# Patient Record
Sex: MALE | Race: WHITE | NOT HISPANIC OR LATINO | Employment: UNEMPLOYED | URBAN - METROPOLITAN AREA
[De-identification: names, ages, dates, MRNs, and addresses within clinical notes are randomized per-mention and may not be internally consistent; named-entity substitution may affect disease eponyms.]

---

## 2023-03-28 ENCOUNTER — DOCUMENTATION (OUTPATIENT)
Dept: CARE COORDINATION | Facility: CLINIC | Age: 33
End: 2023-03-28
Payer: COMMERCIAL

## 2023-03-29 ENCOUNTER — DOCUMENTATION (OUTPATIENT)
Dept: CARE COORDINATION | Facility: CLINIC | Age: 33
End: 2023-03-29
Payer: COMMERCIAL

## 2023-03-29 NOTE — PROGRESS NOTES
Chip Byrne  Program: [facility] Generic Post-Discharge  MRN: [id number]  YOB: 1970    This patient had a RED on Wed, 29 Mar 2023 10:13:39 am EDT    Question(s) with flags that caused the Alert (up to last 5 values   recorded)    Question: Do you understand your plan for recovery and wellness?     Date:     2023-03-29     Color:    red     Answers:  No    Question: Signs of Infection     Date:     2023-03-29     Color:    red     Answers:  Red    Question: Since you left the hospital, have you experienced sweating or   shivering?     Date:     2023-03-29     Color:    red     Answers:  Yes

## 2023-04-03 PROBLEM — F90.9 ATTENTION DEFICIT HYPERACTIVITY DISORDER: Status: ACTIVE | Noted: 2021-09-08

## 2023-04-03 PROBLEM — F33.1 MODERATE RECURRENT MAJOR DEPRESSION (MULTI): Status: ACTIVE | Noted: 2022-03-02

## 2023-04-03 PROBLEM — E11.9 TYPE II DIABETES MELLITUS (MULTI): Status: ACTIVE | Noted: 2022-02-11

## 2023-04-03 PROBLEM — F41.1 GENERALIZED ANXIETY DISORDER: Status: ACTIVE | Noted: 2021-01-01

## 2023-04-03 PROBLEM — K58.0 IRRITABLE BOWEL SYNDROME WITH DIARRHEA: Status: ACTIVE | Noted: 2022-02-11

## 2023-04-03 PROBLEM — E78.00 PURE HYPERCHOLESTEROLEMIA: Status: ACTIVE | Noted: 2022-02-11

## 2023-04-03 PROBLEM — E10.9 TYPE 1 DIABETES MELLITUS (MULTI): Status: ACTIVE | Noted: 2022-03-02

## 2023-04-03 RX ORDER — LANCETS
EACH MISCELLANEOUS DAILY
COMMUNITY
Start: 2021-12-15

## 2023-04-03 RX ORDER — BUPROPION HYDROCHLORIDE 450 MG/1
1 TABLET, FILM COATED, EXTENDED RELEASE ORAL EVERY MORNING
COMMUNITY
End: 2023-11-02 | Stop reason: SDUPTHER

## 2023-04-03 RX ORDER — NAPROXEN 500 MG/1
1 TABLET ORAL 2 TIMES DAILY
COMMUNITY
Start: 2022-06-15 | End: 2023-11-02 | Stop reason: WASHOUT

## 2023-04-03 RX ORDER — ATOMOXETINE 80 MG/1
CAPSULE ORAL
COMMUNITY
End: 2023-11-02 | Stop reason: WASHOUT

## 2023-04-03 RX ORDER — INSULIN GLARGINE 100 [IU]/ML
15 INJECTION, SOLUTION SUBCUTANEOUS NIGHTLY
COMMUNITY
Start: 2021-12-16 | End: 2024-02-12 | Stop reason: SDUPTHER

## 2023-04-03 RX ORDER — LANCETS
EACH MISCELLANEOUS DAILY
COMMUNITY
Start: 2022-02-11

## 2023-04-03 RX ORDER — CYCLOBENZAPRINE HCL 5 MG
1 TABLET ORAL NIGHTLY PRN
COMMUNITY
Start: 2022-06-15 | End: 2023-04-19

## 2023-04-03 RX ORDER — IBUPROFEN 200 MG
CAPSULE ORAL DAILY
COMMUNITY
Start: 2022-02-11

## 2023-04-03 RX ORDER — CHOLECALCIFEROL (VITAMIN D3) 1250 MCG
1 TABLET ORAL
COMMUNITY
Start: 2022-02-11

## 2023-04-04 ENCOUNTER — OFFICE VISIT (OUTPATIENT)
Dept: PRIMARY CARE | Facility: CLINIC | Age: 33
End: 2023-04-04
Payer: COMMERCIAL

## 2023-04-04 VITALS
OXYGEN SATURATION: 98 % | DIASTOLIC BLOOD PRESSURE: 90 MMHG | WEIGHT: 176 LBS | TEMPERATURE: 97.7 F | HEART RATE: 112 BPM | BODY MASS INDEX: 28.41 KG/M2 | SYSTOLIC BLOOD PRESSURE: 140 MMHG

## 2023-04-04 DIAGNOSIS — M25.561 CHRONIC PAIN OF RIGHT KNEE: ICD-10-CM

## 2023-04-04 DIAGNOSIS — J34.2 DEVIATED NASAL SEPTUM: ICD-10-CM

## 2023-04-04 DIAGNOSIS — E10.65 TYPE 1 DIABETES MELLITUS WITH HYPERGLYCEMIA (MULTI): Primary | ICD-10-CM

## 2023-04-04 DIAGNOSIS — F41.1 GENERALIZED ANXIETY DISORDER: ICD-10-CM

## 2023-04-04 DIAGNOSIS — G89.29 CHRONIC PAIN OF RIGHT KNEE: ICD-10-CM

## 2023-04-04 PROCEDURE — 99214 OFFICE O/P EST MOD 30 MIN: CPT | Performed by: FAMILY MEDICINE

## 2023-04-04 PROCEDURE — 3080F DIAST BP >= 90 MM HG: CPT | Performed by: FAMILY MEDICINE

## 2023-04-04 PROCEDURE — 1036F TOBACCO NON-USER: CPT | Performed by: FAMILY MEDICINE

## 2023-04-04 PROCEDURE — 3077F SYST BP >= 140 MM HG: CPT | Performed by: FAMILY MEDICINE

## 2023-04-04 RX ORDER — VILOXAZINE HYDROCHLORIDE 200 MG/1
1 CAPSULE, EXTENDED RELEASE ORAL EVERY MORNING
COMMUNITY
Start: 2023-03-15 | End: 2023-11-02 | Stop reason: WASHOUT

## 2023-04-04 NOTE — PROGRESS NOTES
"  Assessment     ASSESSMENT/PLAN:      Problem List Items Addressed This Visit          Endocrine/Metabolic    Type 1 diabetes mellitus (CMS/HCC) - Primary       Other    Generalized anxiety disorder     Other Visit Diagnoses       Deviated nasal septum        Chronic pain of right knee                Patient Instructions:  Patient Instructions   T1DM: Patient advised to get A1c, call endocrinology about increasing mealtime Lantus, advised patient that increasing it to 20 units 3 times daily with meals should be okay  Right knee pain: Continue follow-up with Ortho  Nasal septal deviation: Plan to have nasal surgery, patient will let us know if he needs preop appointment      Signed by: Ria Handy,        FUTURE DIRECTION:   Review MRI, follow up on psych notes, update insulin dosage     Subjective   SUBJECTIVE:     HPI : Patient is a 32 y.o. male who presents today for the following:     Hospital follow-up 3/27/2023: Patient was admitted for syncope, was found to have leukocytosis that improved with IVF, patient was advised to discontinue \"worry until followed up with psychiatry  Since last visit patient states that he notices that his blood sugars can be as low as 160 and as high as > 399 before meals, he has tried to adjust Humalog 8 units on his own    Right knee pain  Occurred during Jujitsu, causing significant weakness, was seen by ortho, plan to have MRI     ADHD   Recently started Quelbree but has stopped it and plans to discuss usage with psychiatry    Review of Systems  Stated above otherwise negative    Past Medical History:   Diagnosis Date    Personal history of other endocrine, nutritional and metabolic disease 12/22/2021    History of diabetes mellitus        Past Surgical History:   Procedure Laterality Date    OTHER SURGICAL HISTORY  02/23/2022    No history of surgery        Current Outpatient Medications   Medication Instructions    atomoxetine (Strattera) 80 mg capsule     blood sugar " diagnostic (Blood Glucose Test) strip miscellaneous, Daily, Use 1 application<BR>FreeStyle Lite Test In Vitro strips    buPROPion XL (Forfivo XL) 450 mg 24 hr tablet 1 tablet, oral, Every morning, For depression    cholecalciferol (Vitamin D3) 1,250 mcg (50,000 unit) tablet 1 tablet, oral, Weekly    cyclobenzaprine (Flexeril) 5 mg tablet 1 tablet, oral, Nightly PRN    insulin glargine (LANTUS) 15 Units, subcutaneous, Nightly    lancets misc Daily, Use 2-3 times<BR>Glucometer Lancets    lancets misc Daily, Use 1 application    naproxen (EC Naprosyn) 500 mg EC tablet 1 tablet, oral, 2 times daily    NON FORMULARY B Complex oral capsules    omega-3/dha/epa/fish oil (OMEGA-3 ORAL) Omega-3 CF 1000 MG oral capsules    pen needle, diabetic (PEN NEEDLE MISC) Daily, Use, for insulin injections (can substitute)<BR>Caretouch Pen Needles 31 G X 6 mm    Qelbree 200 mg capsule,extended release 24hr 1 capsule, oral, Every morning        Allergies   Allergen Reactions    Sulfa (Sulfonamide Antibiotics) Unknown        Social History     Socioeconomic History    Marital status:      Spouse name: Not on file    Number of children: Not on file    Years of education: Not on file    Highest education level: Not on file   Occupational History    Not on file   Tobacco Use    Smoking status: Never    Smokeless tobacco: Never   Vaping Use    Vaping status: Not on file   Substance and Sexual Activity    Alcohol use: Not on file    Drug use: Not on file    Sexual activity: Not on file   Other Topics Concern    Not on file   Social History Narrative    Not on file     Social Determinants of Health     Financial Resource Strain: Not on file   Food Insecurity: Not on file   Transportation Needs: Not on file   Physical Activity: Not on file   Stress: Not on file   Social Connections: Not on file   Intimate Partner Violence: Not on file   Housing Stability: Not on file        Family History   Problem Relation Name Age of Onset    Diabetes  Mother      Arthritis Father      Arthritis Sibling Siblings         psoriatic    Heart attack Paternal Grandparent      Pancreatic cancer Paternal Grandparent          Objective     OBJECTIVE:     Vitals:    04/04/23 1439   BP: 140/90   Pulse: (!) 112   Temp: 36.5 °C (97.7 °F)   SpO2: 98%   Weight: 79.8 kg (176 lb)        Physical Exam  Constitutional:       Appearance: Normal appearance.   HENT:      Head: Normocephalic.   Pulmonary:      Effort: Pulmonary effort is normal.   Musculoskeletal:      Cervical back: Normal range of motion.   Neurological:      Mental Status: He is alert.   Psychiatric:         Mood and Affect: Mood normal.

## 2023-04-04 NOTE — PATIENT INSTRUCTIONS
T1DM: Patient advised to get A1c, call endocrinology about increasing mealtime Lantus,   Right knee pain: Continue follow-up with Ortho  Nasal septal deviation: Plan to have nasal surgery, patient will let us know if he needs preop appointment

## 2023-04-15 DIAGNOSIS — M54.50 LOW BACK PAIN, UNSPECIFIED: ICD-10-CM

## 2023-04-19 RX ORDER — CYCLOBENZAPRINE HCL 5 MG
TABLET ORAL
Qty: 30 TABLET | Refills: 5 | Status: SHIPPED | OUTPATIENT
Start: 2023-04-19

## 2023-04-20 LAB
ESTIMATED AVERAGE GLUCOSE FOR HBA1C: 209 MG/DL
HEMOGLOBIN A1C/HEMOGLOBIN TOTAL IN BLOOD: 8.9 %

## 2023-07-07 ENCOUNTER — HOSPITAL ENCOUNTER (OUTPATIENT)
Dept: DATA CONVERSION | Facility: HOSPITAL | Age: 33
End: 2023-07-07
Attending: GENERAL PRACTICE | Admitting: GENERAL PRACTICE
Payer: COMMERCIAL

## 2023-07-07 DIAGNOSIS — Z88.2 ALLERGY STATUS TO SULFONAMIDES: ICD-10-CM

## 2023-07-07 DIAGNOSIS — E11.9 TYPE 2 DIABETES MELLITUS WITHOUT COMPLICATIONS (MULTI): ICD-10-CM

## 2023-07-07 DIAGNOSIS — J34.2 DEVIATED NASAL SEPTUM: ICD-10-CM

## 2023-07-07 DIAGNOSIS — Z79.82 LONG TERM (CURRENT) USE OF ASPIRIN: ICD-10-CM

## 2023-07-07 DIAGNOSIS — J34.3 HYPERTROPHY OF NASAL TURBINATES: ICD-10-CM

## 2023-07-07 DIAGNOSIS — Z79.4 LONG TERM (CURRENT) USE OF INSULIN (MULTI): ICD-10-CM

## 2023-07-07 LAB
ANION GAP IN SER/PLAS: 14 MMOL/L (ref 10–20)
CALCIUM (MG/DL) IN SER/PLAS: 9.1 MG/DL (ref 8.6–10.3)
CARBON DIOXIDE, TOTAL (MMOL/L) IN SER/PLAS: 26 MMOL/L (ref 21–32)
CHLORIDE (MMOL/L) IN SER/PLAS: 99 MMOL/L (ref 98–107)
CREATININE (MG/DL) IN SER/PLAS: 0.98 MG/DL (ref 0.5–1.3)
GFR MALE: >90 ML/MIN/1.73M2
GLUCOSE (MG/DL) IN SER/PLAS: 253 MG/DL (ref 74–99)
POCT GLUCOSE: 269 MG/DL (ref 74–99)
POCT GLUCOSE: 300 MG/DL (ref 74–99)
POCT GLUCOSE: 314 MG/DL (ref 74–99)
POTASSIUM (MMOL/L) IN SER/PLAS: 3.7 MMOL/L (ref 3.5–5.3)
SODIUM (MMOL/L) IN SER/PLAS: 135 MMOL/L (ref 136–145)
UREA NITROGEN (MG/DL) IN SER/PLAS: 15 MG/DL (ref 6–23)

## 2023-09-29 VITALS
RESPIRATION RATE: 12 BRPM | SYSTOLIC BLOOD PRESSURE: 146 MMHG | HEIGHT: 66 IN | TEMPERATURE: 97.2 F | DIASTOLIC BLOOD PRESSURE: 92 MMHG | HEART RATE: 79 BPM | BODY MASS INDEX: 26.22 KG/M2 | WEIGHT: 163.14 LBS

## 2023-09-30 NOTE — H&P
History of Present Illness:   History Present Illness:  Reason for surgery: deviated septum   HPI:       · Deviated septum (470) (J34.2)    Provider Impressions    32yoM with deviated septum, plan for septoturbinoplasty.        Chief Complaint    Discuss surgery      History of Present [id number] presents for discussion of upcoming surgery. prior surgery cancelled  due to scheduling issue.   no change in nasal symptoms.       Recall from prior note:     The pt is a 32yoM who presents for f/u of nasal obstruction/congestion, would like to discuss possible septoturbs.  reports ongoing R>L nasal obstruction/congestion. has used flonase spray for >3mo without significant relief of symptoms. reports use of breathrite strips without consistent relief.   reports minor nasal trauma with combatives, but no nasal fracture or persistent change in airflow associated with trauma.   no sinusitis symptoms.       Recall from prior note:      The pt is a 32yoM with DM (now well controlled after diagnosis this year) who presents for evaluation of chronic nasal obstruction/congestion which he attributes to nasal trauma as a youth. 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   Notes R>L chronic nasal obstruction.  Denies significant seasonal allergic rhinitis symptoms.   Notes intermittent right eye pressure sensation, but follows with optometry without concerning findings.  Denies significant recurrent sinusitis history.    Reports some history of epistaxis, but only when living/traveling in arid climates.     no concerns for external nasal appearance.              Review of Systems  ROS: Pertinent positives as noted in HPI.    - CONSTITUTIONAL: Does not report weight loss, fever or chills.    - HEENT:   Ear: Does not report tinnitus, vertigo, hearing loss, otalgia, otorrhea  Nose: Does not report decreased smell  Throat: Does not report pain,  dysphagia, odynophagia  Larynx: Does not report hoarseness, voice changes, noisy breathing, difficulty breathing, pain with speaking (odynophonia)  Trachea: Does not report noisy or difficulty breathing  Neck: Does not report new lumps or bumps, pain, swelling  Face: Does not report sinus pain, pressure, swelling, numbness     - RESPIRATORY: Does not report SOB or cough.    - CV: Does not report palpitations or chest pain.     - GI: Does not report abdominal pain, nausea, vomiting or diarrhea.    - : Does not report dysuria or urinary frequency.    - MSK: Does not report myalgia or joint pain.    - SKIN: Does not report rash or pruritus.    - NEUROLOGICAL: Does not report headache or syncope.    - PSYCHIATRIC: Does not report recent changes in mood. Does not report anxiety or depression.           Active Problems   · Abdominal pain (789.00) (R10.9)   · Abnormal CT of the abdomen (793.6) (R93.5)   · Acute bilateral low back pain without sciatica (724.2,338.19) (M54.50)   · Acute lateral meniscal tear, subsequent encounter (V58.89) (S83.289D)   · Bloating (787.3) (R14.0)   · BMI 27.0-27.9,adult (V85.23) (Z68.27)   · Burning with urination (788.1) (R30.0)   · Change in bowel habits (787.99) (R19.4)   · Chondral defect of right patella (717.9) (M23.8X1)   · Darkening of skin (709.09) (L81.4)   · Deviated septum (470) (J34.2)   · Diarrhea (787.91) (R19.7)   · Discogenic low back pain (724.2) (M51.36)   · Elevated blood sugar (790.29) (R73.9)   · Exposure to STD (V01.6) (Z20.2)   · Flank pain (789.09) (R10.9)   · Gross hematuria (599.71) (R31.0)   · Hip pain, bilateral (719.45) (M25.551,M25.552)   · Hip pain, unspecified laterality (719.45) (M25.559)   · Hypertrophy of both inferior nasal turbinates (478.0) (J34.3)   · Knee pain, left (719.46) (M25.562)   · Lateral meniscus tear (836.1) (S83.289A)   · Long-term insulin use (V58.67) (Z79.4)   · Loss of appetite (783.0) (R63.0)   · Low back pain, unspecified (724.2)  (M54.50)   · 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)   · Muscle weakness of lower extremity (728.87) (M62.81)   · Pain of left hip joint (719.45) (M25.552)   · Prepatellar bursitis of right knee (726.65) (M70.41)   · Right knee pain (719.46) (M25.561)   · Sacroiliac joint dysfunction of left side (724.6) (M53.3)   · Sacroiliitis (720.2) (M46.1)   · Type 1 diabetes mellitus (250.01) (E10.9)   · Urinary frequency (788.41) (R35.0)    Past Medical History   · History of diabetes mellitus (V12.29) (Z86.39)    Surgical History   · No history of surgery    Family History   · Family history of type 2 diabetes mellitus (V18.0) (Z83.3)    Social History   · No alcohol use   · No illicit drug use   · Non-smoker (V49.89) (Z78.9)   · Denied: History of Social alcohol use    Allergies   · Misc. Sulfa Containing Compounds  Recorded By: Windy Maloney; 12/22/2021 2:18:29 PM   · sulfa  Recorded By: Manuel Hernandez; 1/12/2023 1:02:10 PM    Current Meds    Medication Name Instruction   Accu-Chek Guide In Vitro Strip TEST 6 TIMES PER DAY   Acetaminophen 500 MG Oral Tablet TAKE 2 TABLET Every 8 hours   Aspirin EC 81 MG TBEC TAKE 1 TABLET Every twelve hours   buPROPion HCl ER (XL) 450 MG Oral Tablet Extended Release 24 Hour Take 1 tablet daily   Celecoxib 200 MG Oral Capsule TAKE 1 CAPSULE BY MOUTH TWICE A DAY WITH FOOD   Cyclobenzaprine HCl - 5 MG Oral Tablet TAKE 1 TABLET BY MOUTH DAILY AT BEDTIME AS NEEDED FOR MUSCLE SPASM   Easy Touch Pen Needles 31G X 5 MM USE 4 TIMES A DAY   FreeStyle Annika 3 Sensor change sensor every 14 days   FreeStyle Annika 3 Sensor Use one new sensor every 14 days to check blood glucose   HumaLOG KwikPen 100 UNIT/ML Subcutaneous Solution Pen-injector Inject 1 unit for every 10 grams of carb  Max of 60 units per day with sliding scale   Lantus SoloStar 100 UNIT/ML Subcutaneous Solution Pen-injector INJECT 18 UNIT Bedtime   Ondansetron 4 MG Oral Tablet Disintegrating TAKE 1 TABLET BY MOUTH TWICE A DAY AS NEEDED    Qelbree 100 MG Oral Capsule Extended Release 24 Hour    Sertraline HCl - 25 MG Oral Tablet    Sertraline HCl - 50 MG Oral Tablet      Vitals  Vital Signs    Recorded: 19Jun2023 03:08PM   Height 5 ft 6 in   Weight 174 lb    BMI Calculated 28.08 kg/m2   BSA Calculated 1.89   Tobacco Use b) No   PHQ-2  #1. Over the last 2 weeks have you felt down, depressed or hopeless?  (If yes, answer PHQ-9 below) No   PHQ-2  #2. Over the last 2 weeks have you felt little interest  or pleasure in doing things?  (If yes, answer PHQ-9 below) No   Falls Screening (Age 18+) a) No falls within the last year     Physical Exam    GENERAL: Alert & Oriented to person, place and thing; Normal affect and appearance. Well developed and well nourished. Conversant & cooperative with examination.     HEAD: Normocephalic, atraumatic. No sinus tenderness to palpation. Normal parotid bilaterally. Normal facial strength.     NEUROLOGIC: Cranial nerves II-XII grossly intact, gait WNL. Normal mood and affect.    EYES: Extraocular movements intact. Pupils equal, round, reactive to light and accomodation. No nystagmus, no ptosis. no scleral injection.    EAR: Normal auricle. No discomfort or TTP with manipulation. Otoscopic exam showed normal external auditory canals bilaterally. No purulence or EAC inflammation. Minimal cerumen. Right tympanic membrane clear and mobile without evidence of perforation,  retraction or middle ear effusion. Left tympanic membrane clear and mobile without evidence of perforation, retraction or middle ear effusion.     NOSE: mild rightward convex C shape external deformity. No external nasal lesions, lacerations, or scars. Nasal tip symmetrical with normal nasal valves. Nasal cavity with low rightward septum with posterior rightward spur, normal mucosa and l>r obstructive  turbinates. No lesions, masses, purulence or polyps. >75% APPARENT OBSTRUCTION OF AIR PASSAGEWAY DUE TO SEPTAL AND TURBINATE ANATOMY.    OC/OP: Mucous  membranes moist, no masses, lesions or exudates. Normal tongue, floor of mouth, teeth, gums, lips. Normal posterior pharyngeal wall. Normal tonsils without erythema, exudate or obvious calculi     NECK: No neck masses or thyroid enlargement. Trachea midline. No tenderness to palpation    LYMPHATIC: No cervical lymphadenopathy.     RESPIRATORY: Symmetric chest elevation & no retractions. No significant hoarseness. No increased work of breathing.    CV: No clubbing or cyanosis. No obvious edema    Skin: no facial rashes, vesicles or lesions.     Extremities: no gross abnormalities                  Signatures  Electronically signed by : Peter Moser MD; Jun 19 2023  3:20PM EST (Author)        Allergies:        Allergies:  ·  sulfa drugs : Unknown    Home Medication Review:   Home Medications Reviewed: yes     Impression/Procedure:   ·  Impression and Planned Procedure: deviated septum, plan for septoturbinoplasty       ERAS (Enhanced Recovery After Surgery):  ·  ERAS Patient: no       Vital Signs:  Temperature C: 36.2 degrees C   Temperature F: 97.1 degrees F   Heart Rate: 79 beats per minute   Respiratory Rate: 12 breath per minute   Blood Pressure Systolic: 146 mm/Hg   Blood Pressure Diastolic: 92 mm/Hg     Physical Exam by System:    Constitutional: see above exam as listed   Eyes: see above exam as listed   ENMT: see above exam as listed   Head/Neck: see above exam as listed   Respiratory/Thorax: see above exam as listed   Cardiovascular: see above exam as listed   Musculoskeletal: see above exam as listed   Lymphatic: see above exam as listed   Psychological: see above exam as listed   Skin: see above exam as listed     Consent:   COVID-19 Consent:  ·  COVID-19 Risk Consent Surgeon has reviewed key risks related to the risk of hailee COVID-19 and if they contract COVID-19 what the risks are.       Electronic Signatures:  Peter Moser)  (Signed 24-Jul-2023 12:02)   Authored: History of Present  Illness, Allergies, Home  Medication Review, Impression/Procedure, ERAS, Physical Exam, Consent, Note Completion      Last Updated: 24-Jul-2023 12:02 by Peter Moser)

## 2023-10-02 NOTE — OP NOTE
PROCEDURE DETAILS    Preoperative Diagnosis:  Deviated nasal septum, J34.2  Nasal turbinate hypertrophy, J34.3    Postoperative Diagnosis:  Deviated nasal septum, J34.2  Nasal turbinate hypertrophy, J34.3    Surgeon: Peter Moser  Resident/Fellow/Other Assistant: None of these were associated with this case    Procedure:  1.  Septoplasty and Turbinoplasty     Anesthesia: No anesthesiologist associated with this case  Estimated Blood Loss: 10 ml  Findings: low rightward and postesrior rightward septum.  right side only rent.    Specimens(s) Collected: no,           Operative Report:   Procedure   The patient was met in the preoperative holding area, and the operative plan was discussed.  All questions were answered.    The patient was brought back to the operative suite by anesthesia staff, laid supine on the operating table and general endotracheal anesthesia was induced.  The patient was prepped and draped in the usual standard fashion and an operative pause was performed.   A total of 9cc of 1% lidocaine with 1:100,000 epinephrine was injected in the submucoperichondreal plane bilaterally, as well as into the inferior turbinates.  Afrin soaked pledgets were then inserted into the nose, and allowed to sit while the surgical  team scrubbed.  Attention was first turned to the inferior turbinates.  A 2mm straight shot microdebrider with the turbinate blade was used to perform a submucous resection of the right and then left inferior turbinate with good effect.  A [place] elevator  was then used to outfracture the inferior turbinates.    After this, attention was turned to the septum.  A left sided hemitransfixion incision was made using a 15 blade to access the submucoperichondrial plane.  This plane was then further developed using a phil and then flaca elevator.  The dissection  was carried back to and beyond the bony/cartilagenous junction, down to the floor of the nose, and high into the nasal valve.    Of note, the dissection plane was found to be  easily raised. The caudal aspect of the septum was then reflected through the  hemitranfixion incision and a 15 blade was used to access the submucoperichondrial plane on the contralateral side.  A similar dissection of the plane was peformed on this side.  After this, the deviated and obstructive septal cartilage and bone was reshaped  and/or removed using a combination of the septal D knife, double action septal scissors, through biting forceps, and jamie forceps.  Care was taken to leave an L strut of dorsal/caudal septum of >1.5cm width.  Care was taken not to create or propagate  fractures superiorly to endanger the skull base.  Superior and then inferior releasing incisions were made in the septum prior to any grasping force was applied.  After repair of the deviated septum, the overall septum was felt to be straight and midline.   The nasal passages were widely patent bilaterally.    There   was a right side only mucosal rent .    Quilting sutures made of 4-0 fast gut were placed to approximate the septal mucosa and close any mucosal rents present.  The hemitransfixion incision was closed with 4-0 fast gut.  Barker splints coated in bactroban were placed in the nasal cavity under  direct visualization, and secured in place using 2-0 prolene.  An orogastric tube was passed to suction out the contents of the esophagus and stomach.  The patient was then turned back over to the anesthesia service for extubation and transport to the  post-operative recovery area.                          Attestation:   Note Completion:  Attending Attestation I performed the procedure without a resident         Electronic Signatures:  Peter Moser)  (Signed 07-Jul-2023 08:30)   Authored: Post-Operative Note, Chart Review, Note Completion      Last Updated: 07-Jul-2023 08:30 by Peter Moser)

## 2023-11-02 ENCOUNTER — TELEMEDICINE (OUTPATIENT)
Dept: PRIMARY CARE | Facility: CLINIC | Age: 33
End: 2023-11-02
Payer: COMMERCIAL

## 2023-11-02 DIAGNOSIS — F41.1 GENERALIZED ANXIETY DISORDER: Primary | ICD-10-CM

## 2023-11-02 PROCEDURE — 99213 OFFICE O/P EST LOW 20 MIN: CPT | Performed by: FAMILY MEDICINE

## 2023-11-02 RX ORDER — BUPROPION HYDROCHLORIDE 450 MG/1
450 TABLET, FILM COATED, EXTENDED RELEASE ORAL EVERY MORNING
Qty: 90 TABLET | Refills: 1 | Status: SHIPPED | OUTPATIENT
Start: 2023-11-02 | End: 2024-04-10

## 2023-11-02 NOTE — PROGRESS NOTES
Assessment     ASSESSMENT/PLAN:      Problem List Items Addressed This Visit       Generalized anxiety disorder - Primary    Relevant Medications    buPROPion XL (Forfivo XL) 450 mg 24 hr tablet         Patient Instructions:  Patient Instructions   We will try to order bupropion 450mg daily      Patient consented to virtual encounter. If patient was felt to need in-person evaluation they were directed to the office or ED as appropriate. Total time spent interacting with patient was [8] minutes.     Patient presents today via video telemedicine encounter. This encounter is not taking place in person due to COVID.    Signed by: Ria Handy DO       FUTURE DIRECTION:   []    Subjective     SUBJECTIVE:     HPI : Patient is a 33 y.o. male who presents today via video telemedicine encounter to discuss the following:    Depression/TAIWO  - has been seeing psych and has been managed with bupropion 450mg   - also taking supplements B vitamin complex, Fish oil 1000mg, lions darien mushroom complex, ashwaghanda 3,000, 88mg saffron,     Review of Systems    Objective   OBJECTIVE:     There were no vitals filed for this visit.    Physical Exam  Constitutional:       Appearance: Normal appearance.   HENT:      Head: Normocephalic.   Pulmonary:      Effort: Pulmonary effort is normal.   Musculoskeletal:      Cervical back: Normal range of motion.   Neurological:      Mental Status: He is alert.   Psychiatric:         Mood and Affect: Mood normal.

## 2024-02-12 DIAGNOSIS — E10.65 TYPE 1 DIABETES MELLITUS WITH HYPERGLYCEMIA (MULTI): Primary | ICD-10-CM

## 2024-02-12 RX ORDER — INSULIN GLARGINE 100 [IU]/ML
18 INJECTION, SOLUTION SUBCUTANEOUS NIGHTLY
Qty: 5 EACH | Refills: 5 | Status: SHIPPED | OUTPATIENT
Start: 2024-02-12 | End: 2024-04-16 | Stop reason: SDUPTHER

## 2024-02-23 ENCOUNTER — OFFICE VISIT (OUTPATIENT)
Dept: URGENT CARE | Facility: CLINIC | Age: 34
End: 2024-02-23
Payer: COMMERCIAL

## 2024-02-23 VITALS
TEMPERATURE: 98.6 F | DIASTOLIC BLOOD PRESSURE: 79 MMHG | HEART RATE: 98 BPM | HEIGHT: 67 IN | OXYGEN SATURATION: 94 % | SYSTOLIC BLOOD PRESSURE: 128 MMHG | BODY MASS INDEX: 29 KG/M2 | WEIGHT: 184.8 LBS

## 2024-02-23 DIAGNOSIS — H10.31 ACUTE BACTERIAL CONJUNCTIVITIS OF RIGHT EYE: Primary | ICD-10-CM

## 2024-02-23 DIAGNOSIS — H66.90 ACUTE OTITIS MEDIA, UNSPECIFIED OTITIS MEDIA TYPE: ICD-10-CM

## 2024-02-23 PROCEDURE — 99213 OFFICE O/P EST LOW 20 MIN: CPT | Performed by: NURSE PRACTITIONER

## 2024-02-23 PROCEDURE — 3074F SYST BP LT 130 MM HG: CPT | Performed by: NURSE PRACTITIONER

## 2024-02-23 PROCEDURE — 1036F TOBACCO NON-USER: CPT | Performed by: NURSE PRACTITIONER

## 2024-02-23 PROCEDURE — 3078F DIAST BP <80 MM HG: CPT | Performed by: NURSE PRACTITIONER

## 2024-02-23 PROCEDURE — 3008F BODY MASS INDEX DOCD: CPT | Performed by: NURSE PRACTITIONER

## 2024-02-23 RX ORDER — CIPROFLOXACIN HYDROCHLORIDE 3 MG/ML
1 SOLUTION/ DROPS OPHTHALMIC
Qty: 5 ML | Refills: 0 | Status: SHIPPED | OUTPATIENT
Start: 2024-02-23 | End: 2024-02-28

## 2024-02-23 RX ORDER — AMOXICILLIN 875 MG/1
875 TABLET, FILM COATED ORAL 2 TIMES DAILY
Qty: 20 TABLET | Refills: 0 | Status: SHIPPED | OUTPATIENT
Start: 2024-02-23 | End: 2024-03-04

## 2024-02-23 ASSESSMENT — ENCOUNTER SYMPTOMS: COUGH: 1

## 2024-02-23 NOTE — PROGRESS NOTES
Subjective   Patient ID: Chip Byrne is a 33 y.o. male.    Patient presents today with chills, runny nose w/rt side is bleeding, rt swollen, both eyes are red and crusty.  Patient states symptoms have been for the 2 to 3 days.  Does wear contacts.  States that runny nose is having clear discharge.  Mild chills unknown fever.  Complains of slight bodyaches.  Denies any eye trauma states that the URI symptoms started first and then the bilateral redness      History provided by:  Patient   used: No    Cough        The following portions of the chart were reviewed this encounter and updated as appropriate:         Review of Systems   Respiratory:  Positive for cough.    All other systems reviewed and are negative.    Objective   Physical Exam  Vitals and nursing note reviewed.   Constitutional:       General: He is not in acute distress.     Appearance: Normal appearance. He is not toxic-appearing.   HENT:      Head: Atraumatic.      Right Ear: External ear normal.      Left Ear: External ear normal.      Nose: Nose normal.      Mouth/Throat:      Mouth: Mucous membranes are moist.   Eyes:      General:         Right eye: Discharge present. No foreign body or hordeolum.         Left eye: Discharge present.No foreign body or hordeolum.      Extraocular Movements: Extraocular movements intact.      Right eye: Normal extraocular motion and no nystagmus.      Left eye: Normal extraocular motion and no nystagmus.      Conjunctiva/sclera:      Right eye: Right conjunctiva is injected.      Left eye: Left conjunctiva is injected.   Cardiovascular:      Rate and Rhythm: Normal rate and regular rhythm.      Heart sounds: No murmur heard.     No gallop.   Pulmonary:      Effort: Pulmonary effort is normal. No respiratory distress.      Breath sounds: Normal breath sounds. No wheezing.   Musculoskeletal:         General: Normal range of motion.      Cervical back: Normal range of motion.   Skin:      General: Skin is warm and dry.      Capillary Refill: Capillary refill takes less than 2 seconds.      Coloration: Skin is not jaundiced.   Neurological:      General: No focal deficit present.      Mental Status: He is alert and oriented to person, place, and time.   Psychiatric:         Mood and Affect: Mood normal.         Behavior: Behavior normal.         Thought Content: Thought content normal.       Procedures    Assessment/Plan   Diagnoses and all orders for this visit:  Acute bacterial conjunctivitis of right eye  -     ciprofloxacin (Ciloxan) 0.3 % ophthalmic solution; Administer 1 drop into the right eye every 2 hours for 5 days.  Acute otitis media, unspecified otitis media type  -     amoxicillin (Amoxil) 875 mg tablet; Take 1 tablet (875 mg) by mouth 2 times a day for 10 days.

## 2024-04-09 DIAGNOSIS — E10.9 TYPE 1 DIABETES MELLITUS WITHOUT COMPLICATIONS (MULTI): ICD-10-CM

## 2024-04-09 DIAGNOSIS — F41.1 GENERALIZED ANXIETY DISORDER: ICD-10-CM

## 2024-04-10 RX ORDER — INSULIN LISPRO 100 [IU]/ML
INJECTION, SOLUTION INTRAVENOUS; SUBCUTANEOUS
Qty: 10 EACH | Refills: 3 | Status: SHIPPED | OUTPATIENT
Start: 2024-04-10

## 2024-04-10 RX ORDER — BUPROPION HYDROCHLORIDE 450 MG/1
450 TABLET, FILM COATED, EXTENDED RELEASE ORAL EVERY MORNING
Qty: 90 TABLET | Refills: 1 | Status: SHIPPED | OUTPATIENT
Start: 2024-04-10 | End: 2024-10-07

## 2024-04-16 ENCOUNTER — OFFICE VISIT (OUTPATIENT)
Dept: ENDOCRINOLOGY | Facility: CLINIC | Age: 34
End: 2024-04-16
Payer: COMMERCIAL

## 2024-04-16 VITALS
SYSTOLIC BLOOD PRESSURE: 130 MMHG | BODY MASS INDEX: 29.35 KG/M2 | DIASTOLIC BLOOD PRESSURE: 72 MMHG | WEIGHT: 187 LBS | HEIGHT: 67 IN | HEART RATE: 83 BPM

## 2024-04-16 DIAGNOSIS — E10.65 TYPE 1 DIABETES MELLITUS WITH HYPERGLYCEMIA (MULTI): Primary | ICD-10-CM

## 2024-04-16 DIAGNOSIS — Z79.4 LONG-TERM INSULIN USE (MULTI): ICD-10-CM

## 2024-04-16 DIAGNOSIS — Z13.0 SCREENING, IRON DEFICIENCY ANEMIA: ICD-10-CM

## 2024-04-16 PROCEDURE — 3078F DIAST BP <80 MM HG: CPT | Performed by: INTERNAL MEDICINE

## 2024-04-16 PROCEDURE — 3008F BODY MASS INDEX DOCD: CPT | Performed by: INTERNAL MEDICINE

## 2024-04-16 PROCEDURE — 95251 CONT GLUC MNTR ANALYSIS I&R: CPT | Performed by: INTERNAL MEDICINE

## 2024-04-16 PROCEDURE — 3075F SYST BP GE 130 - 139MM HG: CPT | Performed by: INTERNAL MEDICINE

## 2024-04-16 PROCEDURE — 99214 OFFICE O/P EST MOD 30 MIN: CPT | Performed by: INTERNAL MEDICINE

## 2024-04-16 RX ORDER — BLOOD-GLUCOSE SENSOR
EACH MISCELLANEOUS
COMMUNITY
Start: 2024-04-06 | End: 2024-04-16 | Stop reason: SDUPTHER

## 2024-04-16 RX ORDER — BLOOD-GLUCOSE SENSOR
EACH MISCELLANEOUS
Qty: 2 EACH | Refills: 11 | Status: SHIPPED | OUTPATIENT
Start: 2024-04-16

## 2024-04-16 RX ORDER — INSULIN GLARGINE 100 [IU]/ML
20 INJECTION, SOLUTION SUBCUTANEOUS NIGHTLY
Qty: 5 EACH | Refills: 5 | Status: SHIPPED | OUTPATIENT
Start: 2024-04-16 | End: 2024-10-13

## 2024-04-16 ASSESSMENT — ENCOUNTER SYMPTOMS
DIARRHEA: 1
ROS SKIN COMMENTS: DRY SKIN
NAUSEA: 1
PALPITATIONS: 1
UNEXPECTED WEIGHT CHANGE: 1
ABDOMINAL PAIN: 1
WEAKNESS: 1
TREMORS: 1
CONSTIPATION: 1
FATIGUE: 1

## 2024-04-16 NOTE — PATIENT INSTRUCTIONS
Thank you for choosing [facility]  for your health care needs.  If you have any questions, concerns or medical needs, please feel free to contact our office at [phone].    Please ensure you complete your blood work one week before the next scheduled appointment.    To obtain blood and urine tests   To restart Lantus 20 units SQ bedtime  To continue Humalog 1 unit for every 10 grams of carb   Please continue an insulin sliding scale as follows:   150-200mg/dL - 2 units   201-250mg/dL - 4 units   251-300 mg/dL - 6 untis   301-350mg/dL - 8 units   >351mg/dL - 10 units  To continue FreeStyle Annika CGM for the intensive glucose monitoring due to the dynamic nature of insulin requirements, the narrow therapeutic index of insulin and the potentially fatal consequences of treatment  Counseled that the goal A1C should be 7% or less  Counseled glycemic control is warranted to prevent microvascular complications  For follow up in 4 months with glucose log

## 2024-04-16 NOTE — PROGRESS NOTES
"Subjective   Chip Byrne is a 33 y.o. male who presents for a follow-up evaluation of Type 1 diabetes mellitus. The initial diagnosis of diabetes was made in December 2021 .     Life has been busy getting.  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      His diabetes was therefore put on the back burner due to the above.      Known complications due to diabetes included none    Cardiovascular risk factors include diabetes mellitus and male gender. The patient is not on an ACE inhibitor or angiotensin II receptor blocker.  The patient has not been previously hospitalized due to diabetic ketoacidosis.     Current symptoms/problems include none. His clinical course has been stable.     The patient is currently checking the blood glucose multiple times per day.    Patient is using: continuous glucose monitor                                                              Hypoglycemia frequency: 0%  Hypoglycemia awareness: N/A     Current Medication Regimen - forgets as he gets busy at work   Humalog 1 unit for every 10 grams      MEALS:  can forget to eat  Pretzel bun and reslish for $6  Beverages - protein drink     Exercise regimen - denies       Review of Systems   Constitutional:  Positive for fatigue and unexpected weight change (Weight gain and weight loss).   HENT:  Positive for tinnitus.    Eyes:  Positive for visual disturbance (When sugar is 500mg/dL).   Cardiovascular:  Positive for palpitations.   Gastrointestinal:  Positive for abdominal pain, constipation, diarrhea and nausea.   Genitourinary:         Denies nocturia x 1-2   Skin:         Dry skin    Neurological:  Positive for tremors and weakness.   All other systems reviewed and are negative.      Objective   /72 (BP Location: Right arm, Patient Position: Sitting, BP Cuff Size: Adult)   Pulse 83   Ht 1.702 m (5' 7\")   Wt 84.8 kg (187 lb)   BMI 29.29 kg/m²   Physical Exam  Vitals and nursing note reviewed. "   Constitutional:       General: He is not in acute distress.     Appearance: Normal appearance. He is normal weight.   HENT:      Head: Normocephalic and atraumatic.      Nose: Nose normal.      Mouth/Throat:      Mouth: Mucous membranes are moist.   Eyes:      Extraocular Movements: Extraocular movements intact.   Cardiovascular:      Rate and Rhythm: Normal rate and regular rhythm.   Pulmonary:      Effort: Pulmonary effort is normal.      Breath sounds: Normal breath sounds.   Musculoskeletal:         General: Normal range of motion.   Skin:     General: Skin is warm.   Neurological:      Mental Status: He is alert and oriented to person, place, and time.   Psychiatric:         Mood and Affect: Mood normal.       Lab Review  Lab Results   Component Value Date    HGBA1C 8.9 (A) 04/19/2023     Lab Results   Component Value Date    GLUCOSE 253 (H) 07/07/2023    CALCIUM 9.1 07/07/2023     (L) 07/07/2023    K 3.7 07/07/2023    CO2 26 07/07/2023    CL 99 07/07/2023    BUN 15 07/07/2023    CREATININE 0.98 07/07/2023     Lab Results   Component Value Date    CHOL 226 (H) 12/08/2021       Health Maintenance:   Foot Exam:   Eye Exam:  Urine Albumin:  March 10, 2022    CGM Interpretation/Plan   14 day CGM download was reviewed in detail as documented above and will be attached to chart.  A minimum of 72 hours of glucose data was used to inform the management plan outlined below.   Only 5% of values is within target range.  He is hyperglycemic at all time points.  He has not been utilizing basal insulin.    Assessment/Plan   33-year-old male presents for follow up for type 1 diabetes mellitus. His hemoglobin A1c has significantly worsened.  His blood pressure is at goal today.    Type 1 diabetes mellitus (Multi)  To obtain blood and urine tests   To restart Lantus 20 units SQ bedtime  To continue Humalog 1 unit for every 10 grams of carb   Please continue an insulin sliding scale as follows:   150-200mg/dL - 2 units    201-250mg/dL - 4 units   251-300 mg/dL - 6 untis   301-350mg/dL - 8 units   >351mg/dL - 10 units  To continue FreeStyle Annika CGM for the intensive glucose monitoring due to the dynamic nature of insulin requirements, the narrow therapeutic index of insulin and the potentially fatal consequences of treatment  Counseled that the goal A1C should be 7% or less  Counseled glycemic control is warranted to prevent microvascular complications  Counseled that the time in range should be >70%      Long-term insulin use (Multi)  Please rotate insulin injection sites      Screening, iron deficiency anemia  To obtain iron panel and Vitamin B 12 levels    For follow up in 4 months with glucose log

## 2024-04-17 ENCOUNTER — LAB (OUTPATIENT)
Dept: LAB | Facility: LAB | Age: 34
End: 2024-04-17
Payer: COMMERCIAL

## 2024-04-17 DIAGNOSIS — E10.65 TYPE 1 DIABETES MELLITUS WITH HYPERGLYCEMIA (MULTI): ICD-10-CM

## 2024-04-17 DIAGNOSIS — Z13.0 SCREENING, IRON DEFICIENCY ANEMIA: ICD-10-CM

## 2024-04-21 PROBLEM — Z13.0 SCREENING, IRON DEFICIENCY ANEMIA: Status: ACTIVE | Noted: 2024-04-21

## 2024-04-21 PROBLEM — Z79.4 LONG-TERM INSULIN USE (MULTI): Status: ACTIVE | Noted: 2024-04-21

## 2024-04-21 PROBLEM — E11.9 TYPE II DIABETES MELLITUS (MULTI): Status: RESOLVED | Noted: 2022-02-11 | Resolved: 2024-04-21

## 2024-04-21 NOTE — ASSESSMENT & PLAN NOTE
To obtain blood and urine tests   To restart Lantus 20 units SQ bedtime  To continue Humalog 1 unit for every 10 grams of carb   Please continue an insulin sliding scale as follows:   150-200mg/dL - 2 units   201-250mg/dL - 4 units   251-300 mg/dL - 6 untis   301-350mg/dL - 8 units   >351mg/dL - 10 units  To continue FreeStyle Annika CGM for the intensive glucose monitoring due to the dynamic nature of insulin requirements, the narrow therapeutic index of insulin and the potentially fatal consequences of treatment  Counseled that the goal A1C should be 7% or less  Counseled glycemic control is warranted to prevent microvascular complications  Counseled that the time in range should be >70%

## 2024-05-23 ENCOUNTER — LAB (OUTPATIENT)
Dept: LAB | Facility: LAB | Age: 34
End: 2024-05-23
Payer: COMMERCIAL

## 2024-05-23 DIAGNOSIS — Z13.0 ENCOUNTER FOR SCREENING FOR DISEASES OF THE BLOOD AND BLOOD-FORMING ORGANS AND CERTAIN DISORDERS INVOLVING THE IMMUNE MECHANISM: Primary | ICD-10-CM

## 2024-05-23 DIAGNOSIS — Z13.0 ENCOUNTER FOR SCREENING FOR DISEASES OF THE BLOOD AND BLOOD-FORMING ORGANS AND CERTAIN DISORDERS INVOLVING THE IMMUNE MECHANISM: ICD-10-CM

## 2024-05-23 LAB
ALBUMIN SERPL BCP-MCNC: 4.9 G/DL (ref 3.4–5)
ALP SERPL-CCNC: 64 U/L (ref 33–120)
ALT SERPL W P-5'-P-CCNC: 22 U/L (ref 10–52)
ANION GAP SERPL CALC-SCNC: 16 MMOL/L (ref 10–20)
AST SERPL W P-5'-P-CCNC: 17 U/L (ref 9–39)
BILIRUB SERPL-MCNC: 1.7 MG/DL (ref 0–1.2)
BUN SERPL-MCNC: 16 MG/DL (ref 6–23)
CALCIUM SERPL-MCNC: 9.4 MG/DL (ref 8.6–10.3)
CHLORIDE SERPL-SCNC: 96 MMOL/L (ref 98–107)
CHOLEST SERPL-MCNC: 245 MG/DL (ref 0–199)
CHOLESTEROL/HDL RATIO: 6.9
CO2 SERPL-SCNC: 29 MMOL/L (ref 21–32)
CREAT SERPL-MCNC: 1.08 MG/DL (ref 0.5–1.3)
CREAT UR-MCNC: 240.6 MG/DL (ref 20–370)
EGFRCR SERPLBLD CKD-EPI 2021: >90 ML/MIN/1.73M*2
EST. AVERAGE GLUCOSE BLD GHB EST-MCNC: 272 MG/DL
GLUCOSE SERPL-MCNC: 286 MG/DL (ref 74–99)
HBA1C MFR BLD: 11.1 %
HDLC SERPL-MCNC: 35.3 MG/DL
IRON SATN MFR SERPL: 59 % (ref 25–45)
IRON SERPL-MCNC: 207 UG/DL (ref 35–150)
LDLC SERPL CALC-MCNC: 166 MG/DL
MICROALBUMIN UR-MCNC: 17.9 MG/L
MICROALBUMIN/CREAT UR: 7.4 UG/MG CREAT
NON HDL CHOLESTEROL: 210 MG/DL (ref 0–149)
POTASSIUM SERPL-SCNC: 3.7 MMOL/L (ref 3.5–5.3)
PROT SERPL-MCNC: 7 G/DL (ref 6.4–8.2)
SODIUM SERPL-SCNC: 137 MMOL/L (ref 136–145)
TIBC SERPL-MCNC: 350 UG/DL (ref 240–445)
TRIGL SERPL-MCNC: 220 MG/DL (ref 0–149)
UIBC SERPL-MCNC: 143 UG/DL (ref 110–370)
VIT B12 SERPL-MCNC: 653 PG/ML (ref 211–911)
VLDL: 44 MG/DL (ref 0–40)

## 2024-05-23 PROCEDURE — 80061 LIPID PANEL: CPT

## 2024-05-23 PROCEDURE — 82043 UR ALBUMIN QUANTITATIVE: CPT

## 2024-05-23 PROCEDURE — 82570 ASSAY OF URINE CREATININE: CPT

## 2024-05-23 PROCEDURE — 83540 ASSAY OF IRON: CPT

## 2024-05-23 PROCEDURE — 83036 HEMOGLOBIN GLYCOSYLATED A1C: CPT

## 2024-05-23 PROCEDURE — 82607 VITAMIN B-12: CPT

## 2024-05-23 PROCEDURE — 80053 COMPREHEN METABOLIC PANEL: CPT

## 2024-05-23 PROCEDURE — 83550 IRON BINDING TEST: CPT

## 2024-05-23 PROCEDURE — 36415 COLL VENOUS BLD VENIPUNCTURE: CPT

## 2024-06-03 NOTE — RESULT ENCOUNTER NOTE
Labs have been reviewed.  The A1C was found to be 11.1%.  There is no evidence of iron deficiency.  The cholesterol values are elevated above goal, but given age, medication is not yet warranted at this time.  All other values look good.  For follow up as scheduled.

## 2024-06-10 ENCOUNTER — TELEPHONE (OUTPATIENT)
Dept: ENDOCRINOLOGY | Facility: CLINIC | Age: 34
End: 2024-06-10
Payer: COMMERCIAL

## 2024-06-21 DIAGNOSIS — M54.50 LOW BACK PAIN, UNSPECIFIED: ICD-10-CM

## 2024-06-25 RX ORDER — CYCLOBENZAPRINE HCL 5 MG
TABLET ORAL
Qty: 30 TABLET | Refills: 5 | OUTPATIENT
Start: 2024-06-25

## 2024-07-29 ENCOUNTER — APPOINTMENT (OUTPATIENT)
Dept: ENDOCRINOLOGY | Facility: CLINIC | Age: 34
End: 2024-07-29
Payer: COMMERCIAL

## 2024-08-20 DIAGNOSIS — F41.1 GENERALIZED ANXIETY DISORDER: ICD-10-CM

## 2024-08-20 RX ORDER — BUPROPION HYDROCHLORIDE 450 MG/1
450 TABLET, FILM COATED, EXTENDED RELEASE ORAL EVERY MORNING
Qty: 90 TABLET | Refills: 1 | Status: SHIPPED | OUTPATIENT
Start: 2024-08-20 | End: 2025-02-16

## 2024-08-20 NOTE — TELEPHONE ENCOUNTER
Pt requesting rf, he will be out. Pt wants to let EOI also know he really appreciates her and she will be extremely missed.     [facility]ion - [facility]   Oral Minoxidil Counseling- I discussed with the patient the risks of oral minoxidil including but not limited to shortness of breath, swelling of the feet or ankles, dizziness, lightheadedness, unwanted hair growth and allergic reaction.  The patient verbalized understanding of the proper use and possible adverse effects of oral minoxidil.  All of the patient's questions and concerns were addressed.

## 2024-08-21 ENCOUNTER — TELEPHONE (OUTPATIENT)
Dept: PRIMARY CARE | Facility: CLINIC | Age: 34
End: 2024-08-21
Payer: COMMERCIAL

## 2024-08-21 NOTE — TELEPHONE ENCOUNTER
Received fax from [employer], Pt's Bupropion requires PA. Covered alternatives are Bupropion HCL ER (SR), or Budeprion XL. Would you like to switch to covered alternative?  Would you like PA?   Please advise. Thanks. JW

## 2024-09-03 ENCOUNTER — TELEPHONE (OUTPATIENT)
Dept: PRIMARY CARE | Facility: CLINIC | Age: 34
End: 2024-09-03
Payer: COMMERCIAL

## 2024-09-03 NOTE — TELEPHONE ENCOUNTER
Pt called rx line on 8/30 at 351p requesting a PA on Bupropion XL - PA requested   Spontaneous, unlabored and symmetrical

## 2024-09-05 DIAGNOSIS — F41.1 GENERALIZED ANXIETY DISORDER: ICD-10-CM

## 2024-09-05 RX ORDER — BUPROPION HYDROCHLORIDE 450 MG/1
450 TABLET, FILM COATED, EXTENDED RELEASE ORAL EVERY MORNING
Qty: 90 TABLET | Refills: 1 | Status: SHIPPED | OUTPATIENT
Start: 2024-09-05 | End: 2025-03-04

## 2024-09-05 NOTE — TELEPHONE ENCOUNTER
Been working on a PA for pt for Buproprion- got the PA approved after pt has been out of meds x2 weeks and [facility] states that they never received the approval and even if they did they do not have the med in stock. Requested that pt call around and find med in stock and have it resent.   Pt is questioning if this could be resent to [facility] (in EMR)? Thanks, CG

## 2024-11-17 ENCOUNTER — HOSPITAL ENCOUNTER (EMERGENCY)
Facility: HOSPITAL | Age: 34
Discharge: OTHER INSTITUTION | End: 2024-11-17
Attending: STUDENT IN AN ORGANIZED HEALTH CARE EDUCATION/TRAINING PROGRAM
Payer: COMMERCIAL

## 2024-11-17 VITALS
HEIGHT: 67 IN | OXYGEN SATURATION: 97 % | TEMPERATURE: 97.5 F | SYSTOLIC BLOOD PRESSURE: 157 MMHG | DIASTOLIC BLOOD PRESSURE: 93 MMHG | BODY MASS INDEX: 28.25 KG/M2 | WEIGHT: 180 LBS | HEART RATE: 92 BPM | RESPIRATION RATE: 17 BRPM

## 2024-11-17 DIAGNOSIS — Z20.2 EXPOSURE TO STD: Primary | ICD-10-CM

## 2024-11-17 LAB
APPEARANCE UR: CLEAR
BILIRUB UR STRIP.AUTO-MCNC: NEGATIVE MG/DL
COLOR UR: COLORLESS
GLUCOSE UR STRIP.AUTO-MCNC: ABNORMAL MG/DL
HIV 1+2 AB+HIV1 P24 AG SERPL QL IA: NONREACTIVE
KETONES UR STRIP.AUTO-MCNC: ABNORMAL MG/DL
LEUKOCYTE ESTERASE UR QL STRIP.AUTO: NEGATIVE
NITRITE UR QL STRIP.AUTO: NEGATIVE
PH UR STRIP.AUTO: 5.5 [PH]
PROT UR STRIP.AUTO-MCNC: NEGATIVE MG/DL
RBC # UR STRIP.AUTO: NEGATIVE /UL
SP GR UR STRIP.AUTO: 1.03
UROBILINOGEN UR STRIP.AUTO-MCNC: NORMAL MG/DL

## 2024-11-17 PROCEDURE — 86780 TREPONEMA PALLIDUM: CPT | Mod: PORLAB | Performed by: STUDENT IN AN ORGANIZED HEALTH CARE EDUCATION/TRAINING PROGRAM

## 2024-11-17 PROCEDURE — 2500000004 HC RX 250 GENERAL PHARMACY W/ HCPCS (ALT 636 FOR OP/ED): Performed by: STUDENT IN AN ORGANIZED HEALTH CARE EDUCATION/TRAINING PROGRAM

## 2024-11-17 PROCEDURE — 87389 HIV-1 AG W/HIV-1&-2 AB AG IA: CPT | Mod: PORLAB | Performed by: STUDENT IN AN ORGANIZED HEALTH CARE EDUCATION/TRAINING PROGRAM

## 2024-11-17 PROCEDURE — 96372 THER/PROPH/DIAG INJ SC/IM: CPT | Performed by: STUDENT IN AN ORGANIZED HEALTH CARE EDUCATION/TRAINING PROGRAM

## 2024-11-17 PROCEDURE — 2500000005 HC RX 250 GENERAL PHARMACY W/O HCPCS: Performed by: STUDENT IN AN ORGANIZED HEALTH CARE EDUCATION/TRAINING PROGRAM

## 2024-11-17 PROCEDURE — 87491 CHLMYD TRACH DNA AMP PROBE: CPT | Mod: PORLAB | Performed by: STUDENT IN AN ORGANIZED HEALTH CARE EDUCATION/TRAINING PROGRAM

## 2024-11-17 PROCEDURE — 81003 URINALYSIS AUTO W/O SCOPE: CPT | Performed by: STUDENT IN AN ORGANIZED HEALTH CARE EDUCATION/TRAINING PROGRAM

## 2024-11-17 PROCEDURE — 99283 EMERGENCY DEPT VISIT LOW MDM: CPT

## 2024-11-17 PROCEDURE — 36415 COLL VENOUS BLD VENIPUNCTURE: CPT | Performed by: STUDENT IN AN ORGANIZED HEALTH CARE EDUCATION/TRAINING PROGRAM

## 2024-11-17 PROCEDURE — 2500000004 HC RX 250 GENERAL PHARMACY W/ HCPCS (ALT 636 FOR OP/ED)

## 2024-11-17 PROCEDURE — 2500000001 HC RX 250 WO HCPCS SELF ADMINISTERED DRUGS (ALT 637 FOR MEDICARE OP): Performed by: STUDENT IN AN ORGANIZED HEALTH CARE EDUCATION/TRAINING PROGRAM

## 2024-11-17 RX ORDER — CEFTRIAXONE 500 MG/1
500 INJECTION, POWDER, FOR SOLUTION INTRAMUSCULAR; INTRAVENOUS ONCE
Status: COMPLETED | OUTPATIENT
Start: 2024-11-17 | End: 2024-11-17

## 2024-11-17 RX ORDER — LIDOCAINE HYDROCHLORIDE 10 MG/ML
INJECTION, SOLUTION INFILTRATION; PERINEURAL
Status: COMPLETED
Start: 2024-11-17 | End: 2024-11-17

## 2024-11-17 RX ORDER — ONDANSETRON HYDROCHLORIDE 2 MG/ML
4 INJECTION, SOLUTION INTRAVENOUS ONCE
Status: COMPLETED | OUTPATIENT
Start: 2024-11-17 | End: 2024-11-17

## 2024-11-17 RX ORDER — DOXYCYCLINE 100 MG/1
100 CAPSULE ORAL 2 TIMES DAILY
Qty: 14 CAPSULE | Refills: 0 | Status: SHIPPED | OUTPATIENT
Start: 2024-11-17 | End: 2024-11-24

## 2024-11-17 RX ORDER — METRONIDAZOLE 250 MG/1
2000 TABLET ORAL ONCE
Status: COMPLETED | OUTPATIENT
Start: 2024-11-17 | End: 2024-11-17

## 2024-11-17 RX ORDER — ONDANSETRON 4 MG/1
8 TABLET, ORALLY DISINTEGRATING ORAL ONCE
Status: COMPLETED | OUTPATIENT
Start: 2024-11-17 | End: 2024-11-17

## 2024-11-17 RX ORDER — DOXYCYCLINE 100 MG/1
100 CAPSULE ORAL ONCE
Status: COMPLETED | OUTPATIENT
Start: 2024-11-17 | End: 2024-11-17

## 2024-11-17 ASSESSMENT — PAIN SCALES - GENERAL: PAINLEVEL_OUTOF10: 0 - NO PAIN

## 2024-11-17 ASSESSMENT — LIFESTYLE VARIABLES
HAVE PEOPLE ANNOYED YOU BY CRITICIZING YOUR DRINKING: NO
EVER HAD A DRINK FIRST THING IN THE MORNING TO STEADY YOUR NERVES TO GET RID OF A HANGOVER: NO
TOTAL SCORE: 0
EVER FELT BAD OR GUILTY ABOUT YOUR DRINKING: NO
HAVE YOU EVER FELT YOU SHOULD CUT DOWN ON YOUR DRINKING: NO

## 2024-11-17 ASSESSMENT — COLUMBIA-SUICIDE SEVERITY RATING SCALE - C-SSRS
2. HAVE YOU ACTUALLY HAD ANY THOUGHTS OF KILLING YOURSELF?: NO
1. IN THE PAST MONTH, HAVE YOU WISHED YOU WERE DEAD OR WISHED YOU COULD GO TO SLEEP AND NOT WAKE UP?: NO
6. HAVE YOU EVER DONE ANYTHING, STARTED TO DO ANYTHING, OR PREPARED TO DO ANYTHING TO END YOUR LIFE?: NO

## 2024-11-17 ASSESSMENT — PAIN - FUNCTIONAL ASSESSMENT: PAIN_FUNCTIONAL_ASSESSMENT: 0-10

## 2024-11-17 NOTE — ED PROVIDER NOTES
HPI   Chief Complaint   Patient presents with    Exposure to STD       HPI: Patient is a 34-year-old otherwise healthy male who is presenting to the emergency department for concern for exposure to an STD.  He reports he had sexual intercourse, unprotected, with a female on Tuesday.  He was informed today by that female that she tested positive for chlamydia.  He has not had any dysuria, no penile discharge, no fevers or chills.  He does report he has had some ingrown hairs down in the area but believes it was because he had just recently shaved.  He has never had an STD previously.  No alcohol or illicits.      ROS: Complete review of systems performed, otherwise negative except as noted in the history of present illness    PMH: Reviewed, documented below in note. Pertinents in HPI  PSH: Reviewed and documented below in note. Pertinents in HPI  SH: No alcohol or illicits.  Not homeless.  Fam: Reviewed, noncontributory to patients current complaint  MEDS: Reviewed and documented below in note. Pertinents in HPI  ALLERGIES: Reviewed and documented below in note.        History provided by:  Patient   used: No                          [place] Coma Scale Score: 15                  Patient History   Past Medical History:   Diagnosis Date    Personal history of other endocrine, nutritional and metabolic disease 12/22/2021    History of diabetes mellitus     Past Surgical History:   Procedure Laterality Date    OTHER SURGICAL HISTORY  02/23/2022    No history of surgery     Family History   Problem Relation Name Age of Onset    Diabetes Mother      Arthritis Father      Arthritis Sibling Siblings         psoriatic    Heart attack Paternal Grandparent      Pancreatic cancer Paternal Grandparent       Social History     Tobacco Use    Smoking status: Never    Smokeless tobacco: Never   Substance Use Topics    Alcohol use: Never    Drug use: Never       Physical Exam   Visit Vitals  BP (!) 157/93  "  Pulse 92   Temp 36.4 °C (97.5 °F)   Resp 17   Ht 1.702 m (5' 7\")   Wt 81.6 kg (180 lb)   SpO2 97%   BMI 28.19 kg/m²   Smoking Status Never   BSA 1.96 m²      Physical Exam  Vitals and nursing note reviewed.   Constitutional:       Appearance: Normal appearance.   HENT:      Head: Normocephalic and atraumatic.      Mouth/Throat:      Mouth: Mucous membranes are moist.      Pharynx: Oropharynx is clear. No oropharyngeal exudate or posterior oropharyngeal erythema.   Neck:      Vascular: No carotid bruit.   Cardiovascular:      Rate and Rhythm: Normal rate and regular rhythm.      Pulses: Normal pulses.      Heart sounds: Normal heart sounds.   Pulmonary:      Effort: Pulmonary effort is normal.      Breath sounds: Normal breath sounds.   Abdominal:      General: There is no distension.      Palpations: Abdomen is soft.      Tenderness: There is no abdominal tenderness. There is no guarding or rebound.   Genitourinary:     Penis: Normal.       Testes: Normal.      Comments: Small areas of healing folliculitis noted to the suprapubic area where the patient had recently shaved. No herpetic lesions, no inguinal lymphadenopathy, no penile drainage  Musculoskeletal:         General: No tenderness, deformity or signs of injury.      Cervical back: Normal range of motion. No rigidity.   Skin:     General: Skin is warm and dry.   Neurological:      Mental Status: He is alert.         No orders to display       Labs Reviewed   URINALYSIS WITH REFLEX CULTURE AND MICROSCOPIC - Abnormal       Result Value    Color, Urine Colorless (*)     Appearance, Urine Clear      Specific Gravity, Urine 1.030      pH, Urine 5.5      Protein, Urine NEGATIVE      Glucose, Urine OVER (4+) (*)     Blood, Urine NEGATIVE      Ketones, Urine TRACE (*)     Bilirubin, Urine NEGATIVE      Urobilinogen, Urine Normal      Nitrite, Urine NEGATIVE      Leukocyte Esterase, Urine NEGATIVE      Narrative:     OVER is reported when the result is greater than " the clinically reportable range.   SYPHILIS SCREENING WITH REFLEX   HIV 1/2 ANTIGEN/ANTIBODY SCREEN [facility] REFLEX TO CONFIRMATION   URINALYSIS WITH REFLEX CULTURE AND MICROSCOPIC    Narrative:     The following orders were created for panel order Urinalysis with Reflex Culture and Microscopic.  Procedure                               Abnormality         Status                     ---------                               -----------         ------                     Urinalysis with Reflex C...[[id number]]  Abnormal            Final result               Extra Urine Gray Tube[[id number]]                            In process                   Please view results for these tests on the individual orders.   C. TRACHOMATIS + N. GONORRHOEAE, AMPLIFIED   EXTRA URINE GRAY TUBE         ED Course & MDM   Diagnoses as of 11/17/24 1855   Exposure to STD           Medical Decision Making  All mentioned lab results, ECGs, and imaging were independently reviewed by myself  - Patient evaluated. Patient is presenting to the emergency department for concern for an exposure to an STD, chlamydia.  On exam he does have some areas of healing folliculitis that do not appear overtly infected warranting antibiotics for this specifically.  I discussed with the patient testing as well as treatment options including prophylactic treatment.  Using joint decision-making, the patient would like to undergo prophylactic treatment.  HIV, syphilis, gonorrhea, and Chlamydia testing was sent in addition to urinalysis.  Patient was then treated with Flagyl, doxycycline, and Rocephin.  Prior to instructing the patient on abstinence until his test results return in addition to completion of his antibiotics, the patient left the emergency department.  He did let nursing know that he did not want a wait for discharge papers and nursing let him go.  Patient requires additional completion of antibiotics, full course of doxycycline, to complete treatment for concern  for chlamydia.  I attempted to call the patient and inform him that he needs a prescription for this medication and to ask what pharmacy the patient would like this sent to however I was unable to get a hold of the patient at his contact information.  Patient left without treatment complete  - Monitored for any changes in stability or symptomatology. Patient remained stable.       *Disclaimer: This note was dictated by speech recognition. Minor errors in transcription may be present. Please call with questions.    Jefferson Zamudio MD             Your medication list        CONTINUE taking these medications        Instructions Last Dose Given Next Dose Due   FreeStyle Annika 3 Sensor device  Generic drug: blood-glucose sensor      USE ONE NEW SENSOR EVERY 14 DAYS TO CHECK BLOOD GLUCOSE       lancets misc           lancets misc           PEN NEEDLE MISC                  ASK your doctor about these medications        Instructions Last Dose Given Next Dose Due   Blood Glucose Test strip  Generic drug: blood sugar diagnostic           buPROPion  mg 24 hr tablet  Commonly known as: Forfivo XL      Take 1 tablet (450 mg) by mouth once daily in the morning. For depression       cholecalciferol 1,250 mcg (50,000 unit) tablet  Commonly known as: Vitamin D3           cyclobenzaprine 5 mg tablet  Commonly known as: Flexeril      TAKE 1 TABLET BY MOUTH DAILY AT BEDTIME AS NEEDED FOR MUSCLE SPASM       insulin glargine 100 unit/mL (3 mL) pen  Commonly known as: Lantus      Inject 20 Units under the skin once daily at bedtime.       insulin lispro 100 unit/mL injection  Commonly known as: HumaLOG KwikPen Insulin      Inject 1 unit for every 10 grams of carb.  Max of 60 units per day       NON FORMULARY           OMEGA-3 ORAL                    Procedure  Procedures     *This report was transcribed using voice recognition software.  Every effort was made to ensure accuracy; however, inadvertent computerized transcription  errors may be present.*  Austin Zamudio MD  11/17/24         Austin Zamudio MD  11/17/24 9054

## 2024-11-17 NOTE — ED TRIAGE NOTES
Pt. Arrived to the ED via private car for c/o possible STD. Pt. Received a text from an individual that he had unprotected sex with that they test positive for chlamydia and to get test.

## 2024-11-18 LAB
C TRACH RRNA SPEC QL NAA+PROBE: NEGATIVE
HOLD SPECIMEN: NORMAL
N GONORRHOEA DNA SPEC QL PROBE+SIG AMP: NEGATIVE
TREPONEMA PALLIDUM IGG+IGM AB [PRESENCE] IN SERUM OR PLASMA BY IMMUNOASSAY: NONREACTIVE

## 2025-01-06 NOTE — PROGRESS NOTES
"Subjective   Chip Byrne is a 34 y.o. male who presents for a follow-up evaluation of Type 1 diabetes mellitus. The initial diagnosis of diabetes was made in December 2021 .     He has had no major changes to his health since his last appointment.  He has only been going to the gym 1-2 times per week.  He keeps his sugars intentionally higher before training.   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      Known complications due to diabetes included none    Cardiovascular risk factors include diabetes mellitus and male gender. The patient is not on an ACE inhibitor or angiotensin II receptor blocker.  The patient has not been previously hospitalized due to diabetic ketoacidosis.     Current symptoms/problems include none. His clinical course has been stable.     The patient is currently checking the blood glucose multiple times per day.    Patient is using: continuous glucose monitor                                                                Hypoglycemia frequency: 0%  Hypoglycemia awareness: N/A     Current Medication Regimen - forgets as he gets busy at work   Humalog 1 unit for every 10 grams    Lantus 20 units subcutaneous bedtime     MEALS:  can forget to eat    Exercise regimen - gym 1-2 times per week        Review of Systems   Constitutional:  Positive for unexpected weight change.   Eyes:  Positive for visual disturbance.   Gastrointestinal:  Positive for abdominal pain, constipation and diarrhea.   Musculoskeletal:  Positive for arthralgias.   Skin:         Dry skin    Neurological:  Positive for tremors, weakness and numbness.   Psychiatric/Behavioral:  Positive for confusion.         Depression  Memory loss   All other systems reviewed and are negative.      Objective   /86   Pulse 85   Ht 1.702 m (5' 7\")   Wt 85.5 kg (188 lb 9.6 oz)   BMI 29.54 kg/m²   Physical Exam  Vitals and nursing note reviewed.   Constitutional:       General: He is not in acute " distress.     Appearance: Normal appearance. He is normal weight.   HENT:      Head: Normocephalic and atraumatic.      Nose: Nose normal.      Mouth/Throat:      Mouth: Mucous membranes are moist.   Eyes:      Extraocular Movements: Extraocular movements intact.   Pulmonary:      Effort: Pulmonary effort is normal.   Musculoskeletal:         General: Normal range of motion.   Neurological:      Mental Status: He is alert and oriented to person, place, and time.   Psychiatric:         Mood and Affect: Mood normal.         Lab Review  Lab Results   Component Value Date    HGBA1C 11.1 (H) 05/23/2024     Lab Results   Component Value Date    GLUCOSE 286 (H) 05/23/2024    CALCIUM 9.4 05/23/2024     05/23/2024    K 3.7 05/23/2024    CO2 29 05/23/2024    CL 96 (L) 05/23/2024    BUN 16 05/23/2024    CREATININE 1.08 05/23/2024     Lab Results   Component Value Date    CHOL 245 (H) 05/23/2024    CHOL 226 (H) 12/08/2021       Health Maintenance:   Foot Exam:   Eye Exam: 2023  Urine Albumin:  May 23, 2024    CGM Interpretation  14 day CGM download was reviewed in detail as documented above and will be attached to chart.  A minimum of 72 hours of glucose data was used to inform the management plan outlined below.    Sufficient data to analyze:  Yes; CGM active 66% of the time  97% of values is above target range, which is above goal.    3% of values is spent in target range, and thus below goal   1% of values is spent with hypoglycemia, and thus at goal       Assessment/Plan   34-year-old male presents for follow up for type 1 diabetes mellitus.  His blood pressure is at goal today.    Type 1 diabetes mellitus (Multi)  To obtain blood tests   To increase Lantus to 28 units SQ bedtime  Please change an insulin sliding scale as follows:   100-150mg/dL - 2 units  150-200mg/dL - 4 units   201-250mg/dL - 6 units   251-300 mg/dL - 8 untis   301-350mg/dL - 10 units   351-400mg/dL - 12 units  To continue FreeStyle Annika CGM for  the intensive glucose monitoring due to the dynamic nature of insulin requirements, the narrow therapeutic index of insulin and the potentially fatal consequences of treatment  Counseled that the goal A1C should be 7% or less  Counseled glycemic control is warranted to prevent microvascular complications      Long-term insulin use (Multi)  Please rotate insulin injection sites  No interest in pump at this time given rc         Screening for thyroid disorder  To obtain TSH with reflex FT4 value    For follow up in 4 months with glucose log

## 2025-01-06 NOTE — PATIENT INSTRUCTIONS
Thank you for choosing [facility]  for your health care needs.  If you have any questions, concerns or medical needs, please feel free to contact our office at [phone].    Please ensure you complete your blood work one week before the next scheduled appointment.    To obtain blood tests   To increase Lantus to 28 units SQ bedtime  Please change an insulin sliding scale as follows:   100-150mg/dL - 2 units  150-200mg/dL - 4 units   201-250mg/dL - 6 units   251-300 mg/dL - 8 untis   301-350mg/dL - 10 units   351-400mg/dL - 12 units  To continue FreeStyle Annika CGM for the intensive glucose monitoring due to the dynamic nature of insulin requirements, the narrow therapeutic index of insulin and the potentially fatal consequences of treatment  Counseled that the goal A1C should be 7% or less  Counseled glycemic control is warranted to prevent microvascular complications  For follow up in 4 months with glucose log

## 2025-01-10 ENCOUNTER — APPOINTMENT (OUTPATIENT)
Dept: ENDOCRINOLOGY | Facility: CLINIC | Age: 35
End: 2025-01-10
Payer: COMMERCIAL

## 2025-01-10 VITALS
SYSTOLIC BLOOD PRESSURE: 134 MMHG | HEIGHT: 67 IN | HEART RATE: 85 BPM | DIASTOLIC BLOOD PRESSURE: 86 MMHG | BODY MASS INDEX: 29.6 KG/M2 | WEIGHT: 188.6 LBS

## 2025-01-10 DIAGNOSIS — Z79.4 LONG-TERM INSULIN USE (MULTI): ICD-10-CM

## 2025-01-10 DIAGNOSIS — E10.9 TYPE 1 DIABETES MELLITUS WITHOUT COMPLICATIONS: ICD-10-CM

## 2025-01-10 DIAGNOSIS — E10.65 TYPE 1 DIABETES MELLITUS WITH HYPERGLYCEMIA (MULTI): Primary | ICD-10-CM

## 2025-01-10 DIAGNOSIS — Z13.29 SCREENING FOR THYROID DISORDER: ICD-10-CM

## 2025-01-10 PROCEDURE — 3075F SYST BP GE 130 - 139MM HG: CPT | Performed by: INTERNAL MEDICINE

## 2025-01-10 PROCEDURE — 95251 CONT GLUC MNTR ANALYSIS I&R: CPT | Performed by: INTERNAL MEDICINE

## 2025-01-10 PROCEDURE — 3079F DIAST BP 80-89 MM HG: CPT | Performed by: INTERNAL MEDICINE

## 2025-01-10 PROCEDURE — 99214 OFFICE O/P EST MOD 30 MIN: CPT | Performed by: INTERNAL MEDICINE

## 2025-01-10 PROCEDURE — 3008F BODY MASS INDEX DOCD: CPT | Performed by: INTERNAL MEDICINE

## 2025-01-10 RX ORDER — MULTIVIT WITH MINERALS/HERBS
TABLET ORAL
COMMUNITY

## 2025-01-10 RX ORDER — BLOOD-GLUCOSE SENSOR
EACH MISCELLANEOUS
Qty: 2 EACH | Refills: 11 | Status: SHIPPED | OUTPATIENT
Start: 2025-01-10

## 2025-01-10 RX ORDER — ACETAMINOPHEN 500 MG
TABLET ORAL AS NEEDED
COMMUNITY
Start: 2023-04-27

## 2025-01-10 RX ORDER — INSULIN GLARGINE 100 [IU]/ML
28 INJECTION, SOLUTION SUBCUTANEOUS NIGHTLY
Qty: 10 EACH | Refills: 5 | Status: SHIPPED | OUTPATIENT
Start: 2025-01-10 | End: 2025-07-09

## 2025-01-10 RX ORDER — INSULIN LISPRO 100 [IU]/ML
INJECTION, SOLUTION INTRAVENOUS; SUBCUTANEOUS
Qty: 10 EACH | Refills: 3 | Status: SHIPPED | OUTPATIENT
Start: 2025-01-10

## 2025-01-13 ASSESSMENT — ENCOUNTER SYMPTOMS
ROS SKIN COMMENTS: DRY SKIN
CONSTIPATION: 1
DIARRHEA: 1
CONFUSION: 1
WEAKNESS: 1
TREMORS: 1
ABDOMINAL PAIN: 1
ARTHRALGIAS: 1
NUMBNESS: 1
UNEXPECTED WEIGHT CHANGE: 1

## 2025-01-14 PROBLEM — Z13.29 SCREENING FOR THYROID DISORDER: Status: ACTIVE | Noted: 2024-04-21

## 2025-01-14 NOTE — ASSESSMENT & PLAN NOTE
To obtain blood tests   To increase Lantus to 28 units SQ bedtime  Please change an insulin sliding scale as follows:   100-150mg/dL - 2 units  150-200mg/dL - 4 units   201-250mg/dL - 6 units   251-300 mg/dL - 8 untis   301-350mg/dL - 10 units   351-400mg/dL - 12 units  To continue FreeStyle Annika CGM for the intensive glucose monitoring due to the dynamic nature of insulin requirements, the narrow therapeutic index of insulin and the potentially fatal consequences of treatment  Counseled that the goal A1C should be 7% or less  Counseled glycemic control is warranted to prevent microvascular complications

## 2025-05-05 ENCOUNTER — TELEPHONE (OUTPATIENT)
Dept: ENDOCRINOLOGY | Facility: CLINIC | Age: 35
End: 2025-05-05
Payer: COMMERCIAL

## 2025-05-05 NOTE — TELEPHONE ENCOUNTER
Mr. Byrne walked into the office after leaving a dentist appointment and is having weird anxiety. Would like to know if he can take Vitamin K and Vitamin D? He did not go to his PCP first because he wanted to make sure that it does not interfere with his current medications.    Next appointment 5/20/25

## 2025-05-16 DIAGNOSIS — E10.65 TYPE 1 DIABETES MELLITUS WITH HYPERGLYCEMIA (MULTI): ICD-10-CM

## 2025-05-16 RX ORDER — INSULIN LISPRO 100 [IU]/ML
INJECTION, SOLUTION INTRAVENOUS; SUBCUTANEOUS
Qty: 15 ML | Refills: 3 | Status: SHIPPED | OUTPATIENT
Start: 2025-05-16

## 2025-05-20 ENCOUNTER — APPOINTMENT (OUTPATIENT)
Dept: ENDOCRINOLOGY | Facility: CLINIC | Age: 35
End: 2025-05-20
Payer: COMMERCIAL

## 2025-05-20 VITALS
HEART RATE: 80 BPM | WEIGHT: 178.4 LBS | SYSTOLIC BLOOD PRESSURE: 118 MMHG | HEIGHT: 67 IN | DIASTOLIC BLOOD PRESSURE: 82 MMHG | BODY MASS INDEX: 28 KG/M2

## 2025-05-20 DIAGNOSIS — E10.65 TYPE 1 DIABETES MELLITUS WITH HYPERGLYCEMIA (MULTI): ICD-10-CM

## 2025-05-20 DIAGNOSIS — Z13.29 SCREENING FOR THYROID DISORDER: ICD-10-CM

## 2025-05-20 DIAGNOSIS — E55.9 VITAMIN D DEFICIENCY: Primary | ICD-10-CM

## 2025-05-20 DIAGNOSIS — Z79.4 LONG-TERM INSULIN USE (MULTI): ICD-10-CM

## 2025-05-20 PROCEDURE — 99214 OFFICE O/P EST MOD 30 MIN: CPT | Performed by: INTERNAL MEDICINE

## 2025-05-20 PROCEDURE — 3008F BODY MASS INDEX DOCD: CPT | Performed by: INTERNAL MEDICINE

## 2025-05-20 PROCEDURE — 95251 CONT GLUC MNTR ANALYSIS I&R: CPT | Performed by: INTERNAL MEDICINE

## 2025-05-20 PROCEDURE — 3074F SYST BP LT 130 MM HG: CPT | Performed by: INTERNAL MEDICINE

## 2025-05-20 PROCEDURE — 3079F DIAST BP 80-89 MM HG: CPT | Performed by: INTERNAL MEDICINE

## 2025-05-20 RX ORDER — INSULIN LISPRO 100 [IU]/ML
10 INJECTION, SOLUTION INTRAVENOUS; SUBCUTANEOUS
Qty: 30 ML | Refills: 5 | Status: SHIPPED | OUTPATIENT
Start: 2025-05-20 | End: 2025-11-16

## 2025-05-20 RX ORDER — INSULIN GLARGINE 100 [IU]/ML
30 INJECTION, SOLUTION SUBCUTANEOUS NIGHTLY
Qty: 10 EACH | Refills: 5 | Status: SHIPPED | OUTPATIENT
Start: 2025-05-20 | End: 2025-11-16

## 2025-05-20 ASSESSMENT — ENCOUNTER SYMPTOMS
BACK PAIN: 1
SLEEP DISTURBANCE: 1

## 2025-05-20 NOTE — PATIENT INSTRUCTIONS
Thank you for choosing [facility]  for your health care needs.  If you have any questions, concerns or medical needs, please feel free to contact our office at [phone].    Please ensure you complete your blood work one week before the next scheduled appointment.    To obtain blood tests today   To restart Lantus 30 units SQ bedtime  To restart Humalog 10 units subcutaneous three times daily before meals   Please continue an insulin sliding scale as follows:   100-150mg/dL - 2 units  150-200mg/dL - 4 units   201-250mg/dL - 6 units   251-300 mg/dL - 8 untis   301-350mg/dL - 10 units   351-400mg/dL - 12 units  To continue FreeStyle Annika CGM for the intensive glucose monitoring due to the dynamic nature of insulin requirements, the narrow therapeutic index of insulin and the potentially fatal consequences of treatment  Counseled that the time in range should be >70%  Counseled that the goal A1C should be 7% or less  Counseled glycemic control is warranted to prevent microvascular complications  For follow up in 4 months with glucose log

## 2025-05-20 NOTE — PROGRESS NOTES
"Subjective   Chip Byrne is a 34 y.o. male who presents for a follow-up evaluation of Type 1 diabetes mellitus. The initial diagnosis of diabetes was made in December 2021.     He has had no major changes to his health since his last appointment.  He has only been going to the gym 1-2 times per week.  He keeps his sugars intentionally higher before training.   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      Dog was almost dying  Trade show season   Had to move as leaase was not renewed   Stress was threfore high  Insulin was moved and not placed in the cooler    No insulin for two weeks     Known complications due to diabetes included none    Cardiovascular risk factors include diabetes mellitus and male gender. The patient is not on an ACE inhibitor or angiotensin II receptor blocker.  The patient has not been previously hospitalized due to diabetic ketoacidosis.     Current symptoms/problems include none. His clinical course has been stable.     The patient is currently checking the blood glucose multiple times per day.    Patient is using: continuous glucose monitor                                                            Hypoglycemia frequency: 0%  Hypoglycemia awareness: N/A     Current Medication Regimen - forgets as he gets busy at work   Humalog 1 unit for every 10 grams    Lantus 20 units subcutaneous bedtime     MEALS:  can forget to eat  [place] bowels    Exercise regimen - gym 1-2 times per week      ADHD  and creatine   Review of Systems   Eyes:  Negative for visual disturbance.   Genitourinary:         Nocturia x 2    Musculoskeletal:  Positive for back pain.   Psychiatric/Behavioral:  Positive for sleep disturbance.    All other systems reviewed and are negative.      Objective   /82   Pulse 80   Ht 1.702 m (5' 7\")   Wt 80.9 kg (178 lb 6.4 oz)   BMI 27.94 kg/m²   Physical Exam  Vitals and nursing note reviewed.   Constitutional:       General: He is not in acute " distress.     Appearance: Normal appearance. He is normal weight.   HENT:      Head: Normocephalic and atraumatic.      Nose: Nose normal.      Mouth/Throat:      Mouth: Mucous membranes are moist.   Eyes:      Extraocular Movements: Extraocular movements intact.   Pulmonary:      Effort: Pulmonary effort is normal.   Musculoskeletal:         General: Normal range of motion.   Neurological:      Mental Status: He is alert and oriented to person, place, and time.   Psychiatric:         Mood and Affect: Mood normal.       Lab Review  Lab Results   Component Value Date    HGBA1C 11.1 (H) 05/23/2024     Lab Results   Component Value Date    GLUCOSE 286 (H) 05/23/2024    CALCIUM 9.4 05/23/2024     05/23/2024    K 3.7 05/23/2024    CO2 29 05/23/2024    CL 96 (L) 05/23/2024    BUN 16 05/23/2024    CREATININE 1.08 05/23/2024     Lab Results   Component Value Date    CHOL 245 (H) 05/23/2024    CHOL 226 (H) 12/08/2021       Health Maintenance:   Foot Exam:   Eye Exam: 2023  Urine Albumin:  May 23, 2024    CGM Interpretation  14 day CGM download was reviewed in detail as documented above and will be attached to chart.  A minimum of 72 hours of glucose data was used to inform the management plan outlined below.    Sufficient data to analyze:  Yes; CGM active 66% of the time  97% of values is above target range, which is above goal.    3% of values is spent in target range, and thus below goal   1% of values is spent with hypoglycemia, and thus at goal       Assessment/Plan   34-year-old male presents for follow up for type 1 diabetes mellitus.  His blood pressure is at goal today.    No problem-specific Assessment & Plan notes found for this encounter.      Annika CGM for the intensive glucose monitoring due to the dynamic nature of insulin requirements, the narrow therapeutic index of insulin and the potentially fatal consequences of treatment  Counseled that the time in range should be >70%  Counseled that the goal A1C should be 7% or less  Counseled glycemic control is warranted to prevent microvascular complications    Long-term insulin use (Multi)  Counseled that insulin omission can lead to DKA    Screening for thyroid disorder  To obtain TSH with reflex FT4        Vitamin D deficiency  To obtain a Vitamin D level    For follow up in 4 months with glucose log

## 2025-05-23 PROBLEM — E55.9 VITAMIN D DEFICIENCY: Status: ACTIVE | Noted: 2025-05-23

## 2025-05-24 NOTE — ASSESSMENT & PLAN NOTE
To obtain blood tests today   To restart Lantus 30 units SQ bedtime  To restart Humalog 10 units subcutaneous three times daily before meals   Please continue an insulin sliding scale as follows:   100-150mg/dL - 2 units  150-200mg/dL - 4 units   201-250mg/dL - 6 units   251-300 mg/dL - 8 untis   301-350mg/dL - 10 units   351-400mg/dL - 12 units  To continue FreeStyle Annika CGM for the intensive glucose monitoring due to the dynamic nature of insulin requirements, the narrow therapeutic index of insulin and the potentially fatal consequences of treatment  Counseled that the time in range should be >70%  Counseled that the goal A1C should be 7% or less  Counseled glycemic control is warranted to prevent microvascular complications

## 2025-10-07 ENCOUNTER — APPOINTMENT (OUTPATIENT)
Dept: ENDOCRINOLOGY | Facility: CLINIC | Age: 35
End: 2025-10-07
Payer: COMMERCIAL

## 2025-10-13 ENCOUNTER — APPOINTMENT (OUTPATIENT)
Dept: ENDOCRINOLOGY | Facility: CLINIC | Age: 35
End: 2025-10-13
Payer: COMMERCIAL